# Patient Record
(demographics unavailable — no encounter records)

---

## 2025-01-10 NOTE — HISTORY OF PRESENT ILLNESS
[Y] : Positive pregnancy history [___] : #1 ([unfilled]): [Pregnancy History] : boy [FreeTextEntry1] : 34yo  LMP 12/23/24 presents c/o a thick vaginal discharge and an itch  [PapSmeardate] : 2023 [PGHxTotal] : 2 [BannerxFulerm] : 1 [PGHxAbortions] : 1 [Currently Active] : currently active [Men] : men

## 2025-02-10 NOTE — HISTORY OF PRESENT ILLNESS
[Y] : Positive pregnancy history [___] : #1 ([unfilled]): [Pregnancy History] : boy [TextBox_4] : 33-year-old para 1-0-1-1 LMP presents for an annual GYN exam.  She is sexually active using pullout method for contraception but is not interested in any other form of birth control [PapSmeardate] : 2/10/25 [PGHxTotal] : 2 [[facility]] : 1 [PGHxAbortions] : 1 [[facility]] : 1 [Currently Active] : currently active [Men] : men